# Patient Record
Sex: FEMALE | Race: BLACK OR AFRICAN AMERICAN | URBAN - METROPOLITAN AREA
[De-identification: names, ages, dates, MRNs, and addresses within clinical notes are randomized per-mention and may not be internally consistent; named-entity substitution may affect disease eponyms.]

---

## 2017-09-05 ENCOUNTER — OFFICE VISIT (OUTPATIENT)
Dept: PEDIATRICS | Facility: CLINIC | Age: 22
End: 2017-09-05
Attending: NURSE PRACTITIONER
Payer: COMMERCIAL

## 2017-09-05 VITALS
BODY MASS INDEX: 41.93 KG/M2 | HEIGHT: 58 IN | DIASTOLIC BLOOD PRESSURE: 54 MMHG | SYSTOLIC BLOOD PRESSURE: 121 MMHG | WEIGHT: 199.74 LBS | HEART RATE: 117 BPM

## 2017-09-05 DIAGNOSIS — T74.22XA CHILD SEXUAL ABUSE, INITIAL ENCOUNTER: ICD-10-CM

## 2017-09-05 DIAGNOSIS — T74.12XA CHILD PHYSICAL ABUSE, INITIAL ENCOUNTER: Primary | ICD-10-CM

## 2017-09-05 DIAGNOSIS — Z65.4 VICTIM OF TORTURE: ICD-10-CM

## 2017-09-05 PROCEDURE — 99213 OFFICE O/P EST LOW 20 MIN: CPT | Mod: ZF
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
## 2017-09-05 NOTE — MR AVS SNAPSHOT
After Visit Summary   9/5/2017    Lara Tierney    MRN: [id number]           Patient Information     Date Of Birth          1995        Visit Information        Provider Department      9/5/2017 1:00 PM Mame Bledsoe APRN CNP [facility]        Care Instructions    [place] [facility]    [facility]    [facility]    [facility], 12th Floor [address] [place] [address]      Deanna Buck MD, FAAP - Director    AMY Sharma, LIC -     Mame Rakan, CNP - Nurse Practitioner    Jennifer Roblero MD, FAAP - Physician    AMY Callaway, LICSW -     [facility]      For questions or concerns, please call our Main Office number at [phone] or [phone] during business hours    Please call [phone] for scheduling    National Child Traumatic Stress Network: Includes resources and information for many different types of traumatic events for all audiences, including parents and caregivers. http://www.nctsn.org/    If you need help locating additional mental health services, please ask a , child protection worker, primary care provider, or another trusted professional. You can also visit [email or web address] for a complete list of professionals who are trained to help children who are victims of traumatic events and their families.                Follow-ups after your visit        Who to contact     Please call your clinic at [phone] to:    Ask questions about your health    Make or cancel appointments    Discuss your medicines    Learn about your test results    Speak to your doctor   If you have compliments or concerns about an experience at your clinic, or if you wish to file a complaint, please contact [facility] Patient Relations at  [phone] or email us at [email or web address]         Additional Information About Your Visit        [employer] Information     [employer]anix is an electronic gateway that provides easy, online access to your medical records. With [employer], you can request a clinic appointment, read your test results, renew a prescription or communicate with your care team.     To sign up for [employer] visit the website at [email or web address]   You will be asked to enter the access code listed below, as well as some personal information. Please follow the directions to create your username and password.     Your access code is: [email or web address]  Expires: 2017  2:50 PM     Your access code will  in 90 days. If you need help or a new code, please contact your [facility] or call [phone] for assistance.        Care EveryWhere ID     This is your Care EveryWhere ID. This could be used by other organizations to access your [facility] medical records  [id number]        Your Vitals Were     Pulse Height BMI (Body Mass Index)             117 4' 10.27\" (148 cm) 41.36 kg/m2          Blood Pressure from Last 3 Encounters:   17 121/54    Weight from Last 3 Encounters:   17 199 lb 11.8 oz (90.6 kg)              Today, you had the following     No orders found for display       Primary Care Provider    None Specified       No primary provider on file.        Equal Access to Services     [facility] : Hadii taylor sequeira hadasho Soalejandroali, waaxda luqadaha, qaybta kaalmada adeegyada, francesco pressley . So [facility] [phone].    ATENCIÓN: Si habla español, tiene a yao disposición servicios gratuitos de asistencia lingüística. Llame al [phone].    We comply with applicable federal civil rights laws and [place] laws. We do not discriminate on the basis of race, color, national origin, age, disability sex, sexual orientation or gender identity.            Thank " you!     Thank you for choosing [facility]  for your care. Our goal is always to provide you with excellent care. Hearing back from our patients is one way we can continue to improve our services. Please take a few minutes to complete the written survey that you may receive in the mail after your visit with us. Thank you!             Your Updated Medication List - Protect others around you: Learn how to safely use, store and throw away your medicines at www.disposemymeds.org.          This list is accurate as of: 9/5/17  2:50 PM.  Always use your most recent med list.                   Brand Name Dispense Instructions for use Diagnosis    FISH OIL + D3 PO
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
## 2017-09-05 NOTE — PATIENT INSTRUCTIONS
[place] [facility]    [facility]    [facility], 12th Floor [address] [address]      Deanna Buck MD, FAAP   Director    Unique Mckeon, MSW, LICSW       Mame Rakan, CNP - Nurse Practitioner    Jennifer Roblero MD, FAAP   Physician    Karen Tang, MSW, [facility] -     [facility]      For questions or concerns, please call our Main Office number at [phone] or [phone] during business hours    Please call [phone] for scheduling    National Child Traumatic Stress Network: Includes resources and information for many different types of traumatic events for all audiences, including parents and caregivers. http://www.nctsn.org/    If you need help locating additional mental health services, please ask a , child protection worker, primary care provider, or another trusted professional. You can also visit [email or web address] for a complete list of professionals who are trained to help children who are victims of traumatic events and their families.
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
## 2019-08-01 ENCOUNTER — DOCUMENTATION ONLY (OUTPATIENT)
Dept: OTHER | Facility: CLINIC | Age: 24
End: 2019-08-01